# Patient Record
Sex: FEMALE | Race: BLACK OR AFRICAN AMERICAN
[De-identification: names, ages, dates, MRNs, and addresses within clinical notes are randomized per-mention and may not be internally consistent; named-entity substitution may affect disease eponyms.]

---

## 2020-12-16 ENCOUNTER — NURSE TRIAGE (OUTPATIENT)
Dept: OTHER | Facility: CLINIC | Age: 25
End: 2020-12-16

## 2020-12-16 NOTE — TELEPHONE ENCOUNTER
Call transferred to writer from . Patient has appt scheduled for tomorrow but wanted to know if it was okay to eat and drink per her norm tonight. This writer advised her to eat and drink per norm unless it is something that makes her feel worse. Patient verbalized understanding.      Reason for Disposition   Information only question and nurse able to answer    Protocols used: INFORMATION ONLY CALL - NO TRIAGE-ADULT-[place]

## 2020-12-16 NOTE — TELEPHONE ENCOUNTER
C/o rectal bleeding and abd pain. Started in September and went to ED and was referred to GI. Bleeding is becoming more frequent and is losing weight. Reason for Disposition   Rectal bleeding is minimal (e.g., blood just on toilet paper, a few drops in toilet bowl)    Answer Assessment - Initial Assessment Questions  1. APPEARANCE of BLOOD: \"What color is it? \" \"Is it passed separately, on the surface of the stool, or mixed in with the stool? \"       Looks like dried blood with red mixed in. Not in stool,it is in water    2. AMOUNT: \"How much blood was passed? \"       Enough to change color of water    3. FREQUENCY: \"How many times has blood been passed with the stools? \"       5 times in since    4. ONSET: \"When was the blood first seen in the stools? \" (Days or weeks)       September    5. DIARRHEA: \"Is there also some diarrhea? \" If so, ask: \"How many diarrhea stools were passed in past 24 hours? \"       Diarrhea when have the bleeding    6. CONSTIPATION: \"Do you have constipation? \" If so, \"How bad is it? \"      Denies    7. RECURRENT SYMPTOMS: \"Have you had blood in your stools before? \" If so, ask: \"When was the last time? \" and \"What happened that time? \"       Not before September    8. BLOOD THINNERS: \"Do you take any blood thinners? \" (e.g., Coumadin/warfarin, Pradaxa/dabigatran, aspirin)      Denies    9. OTHER SYMPTOMS: \"Do you have any other symptoms? \"  (e.g., abdominal pain, vomiting, dizziness, fever)      Abdominal pain    10. PREGNANCY: \"Is there any chance you are pregnant? \" \"When was your last menstrual period? \"        Denies    Protocols used: RECTAL BLEEDING-ADULT-[place]    Patient called pre-service center [facility]) to schedule appointment, with red flag complaint, transferred to RN access for triage. See above questions and answers. Caller talking full sentences without any distress on phone. Discussed disposition and patient agreeable.   Discussed potential consequences for not following disposition recommendation. Aware to call back with any concerns or persistent, worsening, or new symptoms develop. Warm transfer to [facility] scheduling for appointment. Attention Provider: Thank you for allowing me to participate in the care of your patient. The  patient was connected to triage in response to information provided to the ECC. Please do not respond through this encounter as the response is not directed to a shared pool.

## 2020-12-17 ENCOUNTER — OFFICE VISIT (OUTPATIENT)
Dept: INTERNAL MEDICINE CLINIC | Age: 25
End: 2020-12-17

## 2020-12-17 VITALS
TEMPERATURE: 96.7 F | SYSTOLIC BLOOD PRESSURE: 110 MMHG | WEIGHT: 125 LBS | HEART RATE: 96 BPM | HEIGHT: 64 IN | OXYGEN SATURATION: 94 % | DIASTOLIC BLOOD PRESSURE: 78 MMHG | BODY MASS INDEX: 21.34 KG/M2

## 2020-12-17 LAB
BILIRUBIN, POC: NORMAL
BLOOD URINE, POC: NORMAL
CLARITY, POC: NORMAL
COLOR, POC: YELLOW
GLUCOSE URINE, POC: NORMAL
KETONES, POC: NORMAL
LEUKOCYTE EST, POC: NORMAL
NITRITE, POC: NORMAL
PH, POC: 7
PROTEIN, POC: 30
SPECIFIC GRAVITY, POC: 1.02
UROBILINOGEN, POC: 2

## 2020-12-17 PROCEDURE — 99203 OFFICE O/P NEW LOW 30 MIN: CPT | Performed by: INTERNAL MEDICINE

## 2020-12-17 PROCEDURE — 81002 URINALYSIS NONAUTO W/O SCOPE: CPT | Performed by: INTERNAL MEDICINE

## 2020-12-17 SDOH — ECONOMIC STABILITY: TRANSPORTATION INSECURITY
IN THE PAST 12 MONTHS, HAS LACK OF TRANSPORTATION KEPT YOU FROM MEETINGS, WORK, OR FROM GETTING THINGS NEEDED FOR DAILY LIVING?: NO

## 2020-12-17 SDOH — ECONOMIC STABILITY: INCOME INSECURITY: HOW HARD IS IT FOR YOU TO PAY FOR THE VERY BASICS LIKE FOOD, HOUSING, MEDICAL CARE, AND HEATING?: NOT VERY HARD

## 2020-12-17 SDOH — ECONOMIC STABILITY: FOOD INSECURITY: WITHIN THE PAST 12 MONTHS, YOU WORRIED THAT YOUR FOOD WOULD RUN OUT BEFORE YOU GOT MONEY TO BUY MORE.: NEVER TRUE

## 2020-12-17 SDOH — ECONOMIC STABILITY: TRANSPORTATION INSECURITY
IN THE PAST 12 MONTHS, HAS THE LACK OF TRANSPORTATION KEPT YOU FROM MEDICAL APPOINTMENTS OR FROM GETTING MEDICATIONS?: NO

## 2020-12-17 SDOH — ECONOMIC STABILITY: FOOD INSECURITY: WITHIN THE PAST 12 MONTHS, THE FOOD YOU BOUGHT JUST DIDN'T LAST AND YOU DIDN'T HAVE MONEY TO GET MORE.: NEVER TRUE

## 2020-12-17 ASSESSMENT — PATIENT HEALTH QUESTIONNAIRE - PHQ9
1. LITTLE INTEREST OR PLEASURE IN DOING THINGS: 0
SUM OF ALL RESPONSES TO PHQ QUESTIONS 1-9: 0
2. FEELING DOWN, DEPRESSED OR HOPELESS: 0
SUM OF ALL RESPONSES TO PHQ QUESTIONS 1-9: 0
SUM OF ALL RESPONSES TO PHQ QUESTIONS 1-9: 0
SUM OF ALL RESPONSES TO PHQ9 QUESTIONS 1 & 2: 0

## 2020-12-17 NOTE — PATIENT INSTRUCTIONS
TAKE MED AS ADVISED    DIET/ EXERCISE.     FOLLOW UP WITHIN 2 MONTHS / AS NEEDED    FOLLOW UP FOR LABS, GI

## 2020-12-17 NOTE — PROGRESS NOTES
SUBJECTIVE:  Amara Samaniego is a 31 y.o. female [address] [place]   Chief Complaint   Patient presents [facility]   [address] Patient    Other     9-2020 blood in her stool it has happen around 5 more time light red blood  inthe past moth has happened 4x lost 10lbs. cant consume as much food as she use to             PT HERE TO INITIATE CARE         PREVIOUS PCP: DR Martha Wu [facility]    C/O RECTAL BLEED - SINCE SEPT /2020. STATES 4-5 X PAST 3 MONTHS. LAST 4 DAYS AGO. BRIGHT RED BLOOD ON TOILET PAPER AND MIXED WITH STOOL. DENIES TRAUMA. DENIES RECTAL IRRITATION, NO PAIN. NO KNOWN FH OF COLORECTAL CA NOR IBD. PT SEEN AT [address] - HAD [address] DONE  C/O WEIGHT LOSS - STATES LOST 10 LBS SINCE 9/2020. Rowe Hilt NO CHANGE IN APETITE  C/O FATIGUE - PAST FEW MONTHS. ? NO COLD / NO HEAT INTOLERANCE  C/O LOWER ABD PAIN - INTERMITTENT. ? DURATION. ? Carmelina Tolono. ? PAIN SCALE. No N/V, No DIARRHEA, No CONSTIPATION, No MELENA, Yes HEMATOCHEZIA. DENIES TRAUMA  VIT D DEF - ON MED - ? DURATION  ALLERGIC RHINITIS - SEASONAL. NO NASAL CONGESTION, NO POSTNASAL DRAINAGE, NO SINUS PRESSURE, OCC HA, NO SNEEZING, NO WATERY ITCHY EYES.  SCREENING LABS D/W PT    DENIES CP, No SOB, No PALPITATIONS, No COUGH, NO F/C  No DYSURIA, No FREQ, No URGENCY, No HEMATURIA    PMH: REVIEWED AND UPDATED TODAY    PSH: REVIEWED AND UPDATED TODAY    SOCIAL HX: REVIEWED AND UPDATED TODAY    FAMILY HX: REVIEWED AND UPDATED TODAY    ALLERGY:  Patient has no known allergies. MEDS: REVIEWED  Prior to Visit Medications    Medication Sig Taking?  Authorizing Provider   Cholecalciferol (VITAMIN D3) 1.25 MG (47181 UT) TABS Take by mouth daily  Historical Provider, MD     OB/GYN: LMP 11/2020                  PAP SMEAR ? DATE                  NO PRIOR MAMMOGRAM    IMMUNIZATION: INFLUENZA 10/2020, TDAP 3/2014      ROS: COMPREHENSIVE ROS AS IN HX, REST -VE  History obtained from chart review and the patient    OBJECTIVE:   NURSING NOTE AND VITALS REVIEWED  /80 (Site: Right Upper Arm, Position: Sitting, Cuff Size: Medium Adult)   Pulse 103   Temp 96.7 °F (35.9 °C)   Ht 5' 3.75\" (1.619 m)   Wt 125 lb (56.7 kg)   LMP 11/28/2020   SpO2 94%   Breastfeeding No   BMI 21.62 kg/m²     NO ACUTE DISTRESS    REPEAT BP: 110/78 (RT), ALL POSITIONS, NO ORTHOSTASIS     REPEAT PULSE: 96 - MAUNAL    Body mass index is 21.62 kg/m². HEENT: MILD PALLOR, ANICTERIC, PERRLA, EOMI, NO CONJUNCTIVAL ERYTHEMA,                 NO SINUS TENDERNESS  NECK:  SUPPLE, TRACHEA MIDLINE, NT, NO JVD, NO CB, NO LA, NO TM, NO STIFFNESS  CHEST: RESPY EFFORT NL, GOOD AE, NO W/R/C  HEART: S1S2+ REG, NO M/G/R  ABD: SOFT, NT, NO GUARDING, NO RIGIDITY, NO HSM, BS+  EXT: NO EDEMA, NT, PULSES +. DAMARIS'S -VE  NEURO: ALERT AND ORIENTED X 3, NO MENINGEAL SIGNS, NO TREMORS, NL GAIT, NO FOCAL DEFICITS  PSYCH: FAIRLY GOOD AFFECT  BACK: NT, NO ROM, NO CVA TENDERNESS  RECTAL: DEFERRED    PREVIOUS OUTSIDE LABS / CT REVIEWED AND D/W PT     CT ABD/PEL W IVCON9/2/2020  [facility]  Result Impression   IMPRESSION:  1.  Findings consistent with mild colitis from the hepatic flexure to the   rectum.  Clinical correlation and follow-up recommended.  No evidence of   diverticulitis. 2.  No other acute changes seen in the abdomen or pelvis. 3.  Retroverted uterus with IUD in place. COMMUNICATION:  Communicated with: Dr. Jermaine Dueñas on 09/02/2020 at 14:50   hours. UA: PROT 30, URO 2.0  OTHERWISE NEGATIVE    PREG TEST: PT DECLINED    ASSESSMENT / PLAN:     Diagnosis Orders   1. Rectal bleeding  COUNSELLED. PT HEMODYNAMICALY STABLE. COLITIS NOTED ON PREVIOUS CT F/U LABS TO EVAL. REFER GI FOR EVAL - FOR C SCOPE/ ? EGD  EVAL FOR IBD, R/O COLORECTAL CA.   AVOID CONSTIPATION/ STRAINING. IF WORSENING, GO TO ER  MAKE CHANGES AS NEEDED. 2. Weight loss  COUNSELLED. DIET REVIEWED. ADVISED AT LEAST 3 MEALS DAILY. MONITOR WT.   ABS TO EVAL  F/U GI EVAL  MAKE CHANGES AS NEEDED. 3. Other fatigue  COUNSELLED. LABS TO EVAL.  R/O ANEMIA, R/O THYROID D/O.  LIFESTYLE MODIFICATION. MAKE CHANGES AS NEEDED. 4. Lower abdominal pain  COUNSELLED. NO ACUTE FINDINGS ON EXAM  UA NEGATIVE FOR UTI  SYMPTOMATIC RX. MONITOR  F/U GI EVAL  MAKE CHANGES AS NEEDED. 5. Vitamin D deficiency  COUNSELLED. MONITOR ON VIT D SUPPLEMENT. MAKE CHANGES AS NEEDED. 6. Seasonal allergic rhinitis due to other allergic trigger  COUNSELLED. SYMPTOMATIC RX  PT DEFERRED MED. MONITOR  MAKE CHANGES AS NEEDED. 7. Screening for condition  COUNSELLED. LAB TO EVAL. OK WITH PT  MONITOR  MAKE CHANGES AS NEEDED.                          MEDICATION SIDE EFFECTS D/W PATIENT    PT STABLE AT TIME OF D/C.    RETURN TO CLINIC WITHIN 2 MONTHS / PRN    FOLLOW UP FOR LABS, GI

## 2021-01-22 ENCOUNTER — OFFICE VISIT (OUTPATIENT)
Dept: INTERNAL MEDICINE CLINIC | Age: 26
End: 2021-01-22
Payer: COMMERCIAL

## 2021-01-22 VITALS
BODY MASS INDEX: 22.86 KG/M2 | DIASTOLIC BLOOD PRESSURE: 80 MMHG | OXYGEN SATURATION: 93 % | WEIGHT: 129 LBS | HEIGHT: 63 IN | TEMPERATURE: 98.6 F | HEART RATE: 85 BPM | SYSTOLIC BLOOD PRESSURE: 110 MMHG

## 2021-01-22 DIAGNOSIS — K62.5 RECTAL BLEEDING: Primary | ICD-10-CM

## 2021-01-22 DIAGNOSIS — J30.89 SEASONAL ALLERGIC RHINITIS DUE TO OTHER ALLERGIC TRIGGER: ICD-10-CM

## 2021-01-22 DIAGNOSIS — R53.83 OTHER FATIGUE: ICD-10-CM

## 2021-01-22 DIAGNOSIS — E55.9 VITAMIN D DEFICIENCY: ICD-10-CM

## 2021-01-22 DIAGNOSIS — Z13.9 SCREENING FOR CONDITION: ICD-10-CM

## 2021-01-22 DIAGNOSIS — K64.8 OTHER HEMORRHOIDS: ICD-10-CM

## 2021-01-22 DIAGNOSIS — R10.30 LOWER ABDOMINAL PAIN: ICD-10-CM

## 2021-01-22 PROBLEM — J30.9 ALLERGIC RHINITIS DUE TO ALLERGEN: Status: ACTIVE | Noted: 2021-01-22

## 2021-01-22 PROCEDURE — 99214 OFFICE O/P EST MOD 30 MIN: CPT | Performed by: INTERNAL MEDICINE

## 2021-01-22 RX ORDER — HYDROCORTISONE 25 MG/G
CREAM TOPICAL
Qty: 60 G | Refills: 1 | Status: SHIPPED | OUTPATIENT
Start: 2021-01-22

## 2021-01-22 ASSESSMENT — PATIENT HEALTH QUESTIONNAIRE - PHQ9
SUM OF ALL RESPONSES TO PHQ9 QUESTIONS 1 & 2: 0
2. FEELING DOWN, DEPRESSED OR HOPELESS: 0
SUM OF ALL RESPONSES TO PHQ QUESTIONS 1-9: 0

## 2021-01-22 NOTE — PROGRESS NOTES
SUBJECTIVE:  Piyush Cruz is a 31 y.o. female HERE FOR   Chief Complaint   Patient presents with    Hypertension     NO COMPLAINTS     Other        PT HERE FOR EVAL    RECTAL BLEED - STATES PERSISTENT SINCE LAST VISIT. STATES S/P GI EVAL - C SCOPE. +HEMORRHOIDS NOTED PER PT. NO PAIN. HEMORRHOIDS - NOTE ON C SCOPE. + OCC RECTAL IRRITATION  FATIGUE - IMPROVING. ? NO COLD / NO HEAT INTOLERANCE. LABS STILL PENDING  LOWER ABD PAIN - INTERMITTENT. ? DURATION. ? Antonio Lease. PAIN SCALE 5/10. No N/V, No DIARRHEA, No CONSTIPATION, No MELENA, Yes HEMATOCHEZIA. DENIES TRAUMA  VIT D DEF - TAKING MED. LAB PENDING  ALLERGIC RHINITIS - SEASONAL. NO NASAL CONGESTION, NO POSTNASAL DRAINAGE, NO SINUS PRESSURE, OCC HA, NO SNEEZING, NO WATERY ITCHY EYES.  SCREENING LABS D/W PT  WEIGHT LOSS -   RESOLVED. WT NOTED     DENIES CP, No SOB, No PALPITATIONS, No COUGH, NO F/C  No DYSURIA, No FREQ, No URGENCY, No HEMATURIA    PMH: REVIEWED AND UPDATED TODAY    PSH: REVIEWED AND UPDATED TODAY    SOCIAL HX: REVIEWED AND UPDATED TODAY    FAMILY HX: REVIEWED AND UPDATED TODAY    ALLERGY:  Patient has no known allergies. MEDS: REVIEWED  Prior to Visit Medications    Medication Sig Taking? Authorizing Provider   Cholecalciferol (VITAMIN D3) 1.25 MG (78413 UT) TABS Take by mouth daily Yes Historical Provider, MD       ROS: COMPREHENSIVE ROS AS IN HX, REST -VE  History obtained from chart review and the patient    OBJECTIVE:   NURSING NOTE AND VITALS REVIEWED  /70 (Site: Right Upper Arm, Position: Sitting, Cuff Size: Medium Adult)   Pulse 85   Temp 98.6 °F (37 °C)   Ht 5' 3\" (1.6 m)   Wt 129 lb (58.5 kg)   SpO2 93%   BMI 22.85 kg/m²     NO ACUTE DISTRESS    REPEAT BP: 110/80 (RT), NO ORTHOSTASIS     Body mass index is 22.85 kg/m².      HEENT: NO PALLOR, ANICTERIC, PERRLA, EOMI, NO CONJUNCTIVAL ERYTHEMA,                 NO SINUS TENDERNESS  NECK:  SUPPLE, TRACHEA MIDLINE, NT, NO JVD, NO CB, NO LA, NO TM, NO STIFFNESS  CHEST: RESPY EFFORT NL, GOOD AE, NO W/R/C  HEART: S1S2+ REG, NO M/G/R  ABD: SOFT, NT, NO HSM, BS+  EXT: NO EDEMA, NT, PULSES +. DAMARIS'S -VE  NEURO: ALERT AND ORIENTED X 3, NO MENINGEAL SIGNS, NO TREMORS, NL GAIT, NO FOCAL DEFICITS  PSYCH: FAIRLY GOOD AFFECT  BACK: NT, NO ROM, NO CVA TENDERNESS    PREVIOUS LABS REVIEWED AND D/W PT / LAST LAB NOT DONE      ASSESSMENT / PLAN:     Diagnosis Orders   1. Rectal bleeding  COUNSELLED. S/P C SCOPE  HEMORRHOIDS ON  C SCOPE  ADVISED WARM SITZ BATH. PREP H PRN  AVOID CONSTIPATION/ STRAINING. F/U LAB TO EVAL  F/U GI IF WORSENING  MAKE CHANGES AS NEEDED. 2. Other hemorrhoids  COUNSELLED. ADVISED WARM SITZ BATH. START ON ANUSOL HC PRN. F/U IF NOT IMPROVED. AVOID CONSTIPATION/ STRAINING. MAKE CHANGES AS NEEDED. 3. Other fatigue  COUNSELLED. IMPROVING. MONITOR LABS  LIFESTYLE MODIFICATION. MAKE CHANGES AS NEEDED. 4. Lower abdominal pain  COUNSELLED. EXAM UNREMARKABLE  ANALGESICS PRN  F/U LABS  MAKE CHANGES AS NEEDED. 5. Vitamin D deficiency  COUNSELLED. MONITOR ON VIT D SUPPLEMENT. MAKE CHANGES AS NEEDED. 6. Seasonal allergic rhinitis due to other allergic trigger  COUNSELLED. MED PRN. MONITOR  MAKE CHANGES AS NEEDED. 7. Screening for condition  COUNSELLED. LABS TO EVAL - HIV, HEP C WITH LABS - OK WITH PT  MAKE CHANGES AS NEEDED.                          MEDICATION SIDE EFFECTS D/W PATIENT    PT STABLE AT TIME OF D/C.    RETURN TO CLINIC WITHIN 3 MONTHS / PRN    FOLLOW UP FOR LABS

## 2021-02-23 ENCOUNTER — NURSE TRIAGE (OUTPATIENT)
Dept: OTHER | Facility: CLINIC | Age: 26
End: 2021-02-23

## 2021-02-23 NOTE — TELEPHONE ENCOUNTER
Reason for Disposition   SEVERE rectal bleeding (large blood clots; on and off, or constant bleeding)    Answer Assessment - Initial Assessment Questions  1. APPEARANCE of BLOOD: \"What color is it? \" \"Is it passed separately, on the surface of the stool, or mixed in with the stool? \"       Pure red blood, the other day it was darker , but for most part bright red     2. AMOUNT: \"How much blood was passed? \"       Each day she will change her panty liner once or twice a day    3. FREQUENCY: \"How many times has blood been passed with the stools? \"       For the most part , it has been a continuous ooze with what she believes it clots. 4. ONSET: \"When was the blood first seen in the stools? \" (Days or weeks)       5 days ago    5. DIARRHEA: \"Is there also some diarrhea? \" If so, ask: \"How many diarrhea stools were passed in past 24 hours? \"       Yes she has had some diarrhea . In the last 24 hours she had about 4 episodes. 6. CONSTIPATION: \"Do you have constipation? \" If so, \"How bad is it? \"      Yes, this started last week when she was constipated. She states she did not take laxatives. 7. RECURRENT SYMPTOMS: \"Have you had blood in your stools before? \" If so, ask: \"When was the last time? \" and \"What happened that time? \"       She did last year - dr Tiffanie Branch referred to gastro and she had colonoscopy that showed hemorrhoids - since then she been using OTC cream.     8. BLOOD THINNERS: \"Do you take any blood thinners? \" (e.g., Coumadin/warfarin, Pradaxa/dabigatran, aspirin)      No    9. OTHER SYMPTOMS: \"Do you have any other symptoms? \"  (e.g., abdominal pain, vomiting, dizziness, fever)      Every BM there is blood , even when not having a bm , there is a constant ooze of blood. Pt also c/o abd cramping - it comes and goes , right now it is not as severe. Also c / o stomach pain that was more frequent last week - causes her to crouch over - lasts about 10 seconds then goes away .  Denies fever, a little bit of dizziness sometimes , but not right now. 10. PREGNANCY: \"Is there any chance you are pregnant? \" \"When was your last menstrual period? \"        no    Protocols used: RECTAL BLEEDING-ADULT-AH    Patient called Bozena Mello at [facility] [employer] [facility])  with red flag complaint. Brief description of triage: see above    Triage indicates for patient to go to ER -pt states she will likely proceed to ER once she has ride available, but would also like to schedule apt so she has it available asap. Care advice provided, patient verbalizes understanding; denies any other questions or concerns; instructed to call back for any new or worsening symptoms. Writer provided warm transfer to [facility] [place] for appointment scheduling. Attention Provider: Thank you for allowing me to participate in the care of your patient. The patient was connected to triage in response to information provided to the ECC. Please do not respond through this encounter as the response is not directed to a shared pool.

## 2021-02-23 NOTE — TELEPHONE ENCOUNTER
I called pt to le there know that she needs to go to the er since she has been having rectal bleeding for 5 days pt understood

## 2021-02-24 ENCOUNTER — TELEPHONE (OUTPATIENT)
Dept: INTERNAL MEDICINE CLINIC | Age: 26
End: 2021-02-24

## 2021-02-24 NOTE — TELEPHONE ENCOUNTER
LAURENTI Patient indicated that she went to the [facility] on 02.23.21 for symptoms. Patient was told that she has Acid Reflux and to soak in sitz bath for hemmorhoids.  VV scheduled on 03.04.21

## 2021-02-24 NOTE — TELEPHONE ENCOUNTER
----- Message from Codi Hogue sent at 2/23/2021  5:41 PM EST -----  Subject: Appointment Request    Reason for Call: Urgent Return from RN Triage    QUESTIONS  Type of Appointment? Established Patient  Reason for appointment request? No appointments available during search  Additional Information for Provider? Patient called in and was returned   from NT and NT stated needed to be seen in ER and pt still wants to be   scheduled and was having rectal bleeding and abdominal pain . Isatu   green would not let me schedule.  ---------------------------------------------------------------------------  --------------  CALL BACK INFO  What is the best way for the office to contact you? OK to leave message on   voicemail  Preferred Call Back Phone Number? [phone]  ---------------------------------------------------------------------------  --------------  SCRIPT ANSWERS  Patient needs to be seen today or tomorrow? Yes  Nurse Name? Jerel Lepe  (Did RN indicate the need for Red Scheduling?)? No  Have you been diagnosed with COVID-19 (Coronavirus)   tested for COVID-19 (Coronavirus)   or told that you are suspected of having COVID-19 (Coronavirus)? No  Have you had a fever or taken medication to treat a fever within the past   3 days? No  Have you had a cough   shortness of breath or flu-like symptoms within the past 3 days? No  Do you currently have flu-like symptoms including fever or chills   cough   shortness of breath   or difficulty breathing   or new loss of taste or smell? No  (Service Expert  click yes below to proceed with iogyn As Usual   Scheduling)?  Yes

## 2021-02-24 NOTE — TELEPHONE ENCOUNTER
LAURENTI Patient indicated that she went to the [facility] on 02.23.21 for symptoms.  Patient was told that she has Acid Reflux and to soak in sitz bath for hemmorhoids. VV scheduled on 03.04.21

## 2021-12-15 NOTE — TELEPHONE ENCOUNTER
Patient indicated that she went to [facility] for symptoms. Patient was told that she has Acid Reflux and to soak in sitz bath for hemmorhoids.  VV scheduled on 03.04.21 Well  at 5 Years     Nutrition   Having many or most meals together as a family is desirable. Mealtime is a great time to allow the child to tell you of her day, interests, concerns, and worries. Encourage your child to talk and listen to others at the table. Balance good nutrition with what your child wants to eat. Major battles over what your child wants to eat are not worth the emotional cost. Bring only healthy foods home from the grocery store. Choose snacks wisely. Children should drink soda pop only rarely. Low-fat or skim milk is usually a healthier choice. Juice should be no more than 4 oz a day. Water is the preferred beverage. Good table manners take a long time to develop. Model table manners for your child. Development   Your child will grow at a slow but steady rate over the next 2 years. See your child's doctor if your child has a rapid gain in weight or has not gained weight for more than 4 months. Kids can start to develop life long interests in sports, arts and crafts activities, reading, and music. Encourage participation in activities. Remember that the goal of competition is to have fun and develop oneself to the greatest capacity. Winning and losing should receive limited attention. Physical skills vary widely in this age group. Find activities that best fit your child's skills, such as endurance (running), power (swimming), or excellent visual skills (baseball or softball). Get involved in your child's school and stay aware of how your child is doing. If your child is struggling, meet with the teacher, counselor, or principal.    Behavior Control   Kids at this age may take risks. Although they confidently think they will not get hurt, parents should watch them closely, especially when they are near roadways, open water, or near a fire or electricity.  Kids seem to have boundless energy. Prepare in advance for ways to let your child enjoy physical activity.     Dawdling is a normal response at this age and demonstrates that a child is having a difficult time planning and thinking through the steps of accomplishing a task.  Adults play important roles in the life of children at age 10. Children will develop close relationships with teachers. It can be upsetting to a child when adults they love (including parents and teachers) go through difficult times or changes.    Reading and Electronic Media  Read to your child on a daily basis. Make reading a part of the nighttime ritual.   Limit electronic media (TV, DVDs, or computer) time to 1 or 2 hours per day of high quality children's programming. Participate with your child and discuss the content with them. Dental Care    Your child should brush his teeth at least twice a day and should have regular visits to the dentist.   [place] Journey your child's teeth after he has brushed.  Flossing the teeth before bedtime is recommended.  Permanent teeth may soon come in or may have already started coming in. The groves on the permanent teeth are prone to cavities. Parents and dentists need to watch the teeth carefully. Sealants (plastic coatings that adhere to the chewing surface of the molar teeth) may help prevent tooth decay. Ask your child's dentist about this. Safety Tips  Fires and [employer] a home fire escape plan.  Keep a fire extinguisher in or near the kitchen.  Tell your child about the dangers of playing with matches or lighters.  Teach your child emergency phone numbers and to leave the house if fire breaks out.  Turn your water heater to 120°F (50°C). Falls   Outdoor trampolines are not recommended as they are a known hazard for children and have a high risk of injuries associated with their use    Make sure windows are closed or have screens that cannot be pushed out. Car Safety   Everyone in a car must always wear seat belts or be in an appropriate booster seat.     Booster seats should received results within 7 days after the testing took place. *If you receive a survey after visiting one of our offices, please take time to share your experience concerning your physician office visit. These surveys are confidential and no health information about you is shared. We are eager to improve for you and we are counting on your feedback to help make that happen. Child's Well Visit, 6 Years: Care Instructions  Your Care Instructions     Your child is probably starting school and new friendships. Your child will have many things to share with you every day as they learn new things in school. It is important that your child gets enough sleep and healthy food during this time. By age 10, most children are learning to use words to express themselves. They may still have typical  fears of monsters and large animals. Your child may enjoy playing with you and with friends. Follow-up care is a key part of your child's treatment and safety. Be sure to make and go to all appointments, and call your doctor if your child is having problems. It's also a good idea to know your child's test results and keep a list of the medicines your child takes. How can you care for your child at home? Eating and a healthy weight  · Help your child have healthy eating habits. Offer fruits and vegetables at meals and snacks. · Give children foods they like but also give new foods to try. If your child is not hungry at one meal, it is okay for him or her to wait until the next meal or snack to eat. · Check in with your child's school or day care to make sure that healthy meals and snacks are given. · Limit fast food. Help your child with healthier food choices when you eat out. · Offer water when your child is thirsty. Do not give your child more than 4 to 6 oz. of fruit juice per day. Juice does not have the valuable fiber that whole fruit has. Do not give your child soda pop. · Make meals a family time. Have nice conversations at mealtime and turn the TV off. · Do not use food as a reward or punishment for your child's behavior. Do not make your children \"clean their plates. \"  · Let all your children know that you love them whatever their size. Help your children feel good about their bodies. Remind your child that people come in different shapes and sizes. Do not tease or nag children about their weight, and do not say your child is skinny, fat, or chubby. · Limit TV or video time. Research shows that the more TV children watch, the higher the chance that they will be overweight. Do not put a TV in your child's bedroom, and do not use TV and videos as a . Healthy habits  · Have your child play actively for at least one hour each day. Plan family activities, such as trips to the park, walks, bike rides, swimming, and gardening. · Help children brush their teeth 2 times a day and floss one time a day. Take your child to the dentist 2 times a year. · Limit TV or video time. Check for TV programs that are good for 10year olds. · Put a broad-spectrum sunscreen (SPF 30 or higher) on your child before going outside. Use a broad-brimmed hat to shade your child's ears, nose, and lips. · Do not smoke or allow others to smoke around your child. Smoking around your child increases the child's risk for ear infections, asthma, colds, and pneumonia. If you need help quitting, talk to your doctor about stop-smoking programs and medicines. These can increase your chances of quitting for good. · Put your children to bed at a regular time so they get enough sleep. · Teach children to wash their hands after using the bathroom and before eating. Safety  · For every ride in a car, secure your child into a properly installed car seat that meets all current safety standards. For questions about car seats and booster seats, call the [employer] at 3-824.274.2025.   · Make sure your child wears a helmet that fits properly when riding a bike or scooter. · Keep cleaning products and medicines in locked cabinets out of your child's reach. Keep the number for Poison Control (6-376.356.8569) in or near your phone. · Put locks or guards on all windows above the first floor. Watch your child at all times near play equipment and stairs. · Put in and check smoke detectors. Have the whole family learn a fire escape plan. · Watch your child at all times when your child is near water, including pools, hot tubs, and bathtubs. Knowing how to swim does not make your child safe from drowning. · Do not let your child play in or near the street. Children younger than age 6 should not cross the street alone. Immunizations  Flu immunization is recommended once a year for all children ages 7 months and older. Make sure that your child gets all the recommended childhood vaccines, which help keep your child healthy and prevent the spread of disease. Parenting  · Read stories to your child every day. One way children learn to read is by hearing the same story over and over. · Play games, talk, and sing to your child every day. Give them love and attention. · Give your child simple chores to do. Children usually like to help. · Teach your child your home address, phone number, and how to call 911. · Teach children not to let anyone touch their private parts. · Teach your child not to take anything from strangers and not to go with strangers. · Praise good behavior. Do not yell or spank. Use time-out instead. Be fair with your rules and use them in the same way every time. Your child learns from watching and listening to you. School  Most children start first grade at age 10. This will be a big change for your child. · Help your child unwind after school with some quiet time. Set aside some time to talk about the day. · Try not to have too many after-school plans, such as sports, music, or clubs.   · Help your child get work organized. Give your child a desk or table to put school work on.  · Help your child get into the habit of organizing clothing, lunch, and homework at night instead of in the morning. · Place a wall calendar near the desk or table to help your child remember important dates. · Help your child with a regular homework routine. Set a time each afternoon or evening for homework; 15 to 60 minutes is usually enough time. Be near your child to answer questions. Make learning important and fun. Ask questions, share ideas, work on problems together. Show interest in your child's schoolwork. · Have lots of books and games at home. Let your child see you playing, learning, and reading. · Be involved in your child's school, perhaps as a volunteer. When should you call for help? Watch closely for changes in your child's health, and be sure to contact your doctor if:    · You are concerned that your child is not growing or learning normally for his or her age.     · You are worried about your child's behavior.     · You need more information about how to care for your child, or you have questions or concerns. Where can you learn more? Go to https://[employer]iceweb.[employer]. org and sign in to your [employer] account. Enter L575 in the [employer] box to learn more about \"Child's Well Visit, 6 Years: Care Instructions. \"     If you do not have an account, please click on the \"Sign Up Now\" link. Current as of: February 10, 2021               Content Version: 13.0  © 2006-2021 [employer]. Care instructions adapted under license by [facility] ([facility]). If you have questions about a medical condition or this instruction, always ask your healthcare professional. [place] [address] any warranty or liability for your use of this information.